# Patient Record
(demographics unavailable — no encounter records)

---

## 2025-04-22 NOTE — HISTORY OF PRESENT ILLNESS
[FreeTextEntry1] : pt here to establish care- nail injury [de-identified] : Mello Marquez is a 22 yo male with no PMHx  presenting to establish care, eval recent nail avulsion and c/f hyperhydrosis. Patient seen with mother at bedside.   #Left toe nail avulsion: on 4/19 someone stepped on his left big toe toenail. He had seen a Doctor in a hotel that used anesthetic and removed the toenail. He was recommended to follow up with a PCP regarding the injury and for further care. Since his injury he has kept the area wrapped, he has not visualized any pus in the area, he has not changed the dressing. He does not have significant pain at the site. He states that site is more swollen then prior. His mother would like a podiatry referral as well.    #hyperhydrosis, states that it has been ongoing for several months specifically in \his palms arms and other sweat glands, and feet, no limitations in day to day however would like to have it addressed. Uses antiperspirant. However, also notes that he has very cold hands and feet.    ROS: negative for fever, chills, chest pain, sob, nvdc, gu symptoms, ntw   PMHX: no pmhx  PSX: no surgical interventions  Past family hx: distant hx of diabetes in [place]  Medications: No medications  Allergies: tree Nut allergy (gets itchy throat no hx of anaphylaxis. (seasonal)   Recent hospitalizations: none          Social hx:   Smoking none, no nicotine or tobacco use.  Alcoholic beverages: Every other week, 3-4 cocktails standard size.   Drug use: IVDU none, no cocaine heroin or marijuana use.  Occupational: recent graduated looking for a job working. [employer] of [employer].   Nutrition: working to get a good balance of fruits and veggies.   Exercises: goes to the gym and plays basketball regularly.   Sexual: not active, not interested in STI, STD, HIV Testing, Hep b or c testing    HCM:   HTN: negative  Phq -2 negative: 0  Refills: none

## 2025-04-22 NOTE — HISTORY OF PRESENT ILLNESS
[FreeTextEntry1] : pt here to establish care- nail injury [de-identified] : Mello Marquez is a 20 yo male with no PMHx  presenting to establish care, eval recent nail avulsion and c/f hyperhydrosis. Patient seen with mother at bedside.   #Left toe nail avulsion: on 4/19 someone stepped on his left big toe toenail. He had seen a Doctor in a hotel that used anesthetic and removed the toenail. He was recommended to follow up with a PCP regarding the injury and for further care. Since his injury he has kept the area wrapped, he has not visualized any pus in the area, he has not changed the dressing. He does not have significant pain at the site. He states that site is more swollen then prior. His mother would like a podiatry referral as well.    #hyperhydrosis, states that it has been ongoing for several months specifically in \his palms arms and other sweat glands, and feet, no limitations in day to day however would like to have it addressed. Uses antiperspirant. However, also notes that he has very cold hands and feet.    ROS: negative for fever, chills, chest pain, sob, nvdc, gu symptoms, ntw   PMHX: no pmhx  PSX: no surgical interventions  Past family hx: distant hx of diabetes in [place]  Medications: No medications  Allergies: tree Nut allergy (gets itchy throat no hx of anaphylaxis. (seasonal)   Recent hospitalizations: none          Social hx:   Smoking none, no nicotine or tobacco use.  Alcoholic beverages: Every other week, 3-4 cocktails standard size.   Drug use: IVDU none, no cocaine heroin or marijuana use.  Occupational: recent graduated looking for a job working. [employer].   Nutrition: working to get a good balance of fruits and veggies.   Exercises: goes to the gym and plays basketball regularly.   Sexual: not active, not interested in STI, STD, HIV Testing, Hep b or c testing    HCM:   HTN: negative  Phq -2 negative: 0  Refills: none

## 2025-04-22 NOTE — END OF VISIT
[] : Resident [FreeTextEntry3] : #left toenail injury - see podiatry/derm  #hyperhidrosis - hands feet. get labs  #CPE= declined std testing [Time Spent: ___ minutes] : I have spent [unfilled] minutes of time on the encounter which excludes teaching and separately reported services.

## 2025-04-22 NOTE — PHYSICAL EXAM
[No Acute Distress] : no acute distress [Well Nourished] : well nourished [Well Developed] : well developed [Well-Appearing] : well-appearing [PERRL] : pupils equal round and reactive to light [EOMI] : extraocular movements intact [Normal Outer Ear/Nose] : the outer ears and nose were normal in appearance [Normal Oropharynx] : the oropharynx was normal [No JVD] : no jugular venous distention [No Lymphadenopathy] : no lymphadenopathy [Supple] : supple [Thyroid Normal, No Nodules] : the thyroid was normal and there were no nodules present [No Respiratory Distress] : no respiratory distress  [No Accessory Muscle Use] : no accessory muscle use [Clear to Auscultation] : lungs were clear to auscultation bilaterally [Normal Rate] : normal rate  [Regular Rhythm] : with a regular rhythm [Normal S1, S2] : normal S1 and S2 [No Murmur] : no murmur heard [No Carotid Bruits] : no carotid bruits [No Varicosities] : no varicosities [No Edema] : there was no peripheral edema [Soft] : abdomen soft [Non Tender] : non-tender [Non-distended] : non-distended [Normal Bowel Sounds] : normal bowel sounds [Normal Posterior Cervical Nodes] : no posterior cervical lymphadenopathy [Normal Anterior Cervical Nodes] : no anterior cervical lymphadenopathy [No Spinal Tenderness] : no spinal tenderness [Grossly Normal Strength/Tone] : grossly normal strength/tone [No Focal Deficits] : no focal deficits [Normal Affect] : the affect was normal [Normal Insight/Judgement] : insight and judgment were intact [de-identified] : Noted to have right nail blackened and hardened from prior injury  Noted to have white granulation at the nailbed, with erythema, with mild purulence, no erythema. Swelling on L mildly greater than right.  [de-identified] : gait pain limited 2/2 injury of left toe

## 2025-04-22 NOTE — PHYSICAL EXAM
[No Acute Distress] : no acute distress [Well Nourished] : well nourished [Well Developed] : well developed [Well-Appearing] : well-appearing [PERRL] : pupils equal round and reactive to light [EOMI] : extraocular movements intact [Normal Outer Ear/Nose] : the outer ears and nose were normal in appearance [Normal Oropharynx] : the oropharynx was normal [No JVD] : no jugular venous distention [No Lymphadenopathy] : no lymphadenopathy [Supple] : supple [Thyroid Normal, No Nodules] : the thyroid was normal and there were no nodules present [No Respiratory Distress] : no respiratory distress  [No Accessory Muscle Use] : no accessory muscle use [Clear to Auscultation] : lungs were clear to auscultation bilaterally [Normal Rate] : normal rate  [Regular Rhythm] : with a regular rhythm [Normal S1, S2] : normal S1 and S2 [No Murmur] : no murmur heard [No Carotid Bruits] : no carotid bruits [No Varicosities] : no varicosities [No Edema] : there was no peripheral edema [Soft] : abdomen soft [Non Tender] : non-tender [Non-distended] : non-distended [Normal Bowel Sounds] : normal bowel sounds [Normal Posterior Cervical Nodes] : no posterior cervical lymphadenopathy [Normal Anterior Cervical Nodes] : no anterior cervical lymphadenopathy [No Spinal Tenderness] : no spinal tenderness [Grossly Normal Strength/Tone] : grossly normal strength/tone [No Focal Deficits] : no focal deficits [Normal Affect] : the affect was normal [Normal Insight/Judgement] : insight and judgment were intact [de-identified] : Noted to have right nail blackened and hardened from prior injury  Noted to have white granulation at the nailbed, with erythema, with mild purulence, no erythema. Swelling on L mildly greater than right.  [de-identified] : gait pain limited 2/2 injury of left toe

## 2025-04-22 NOTE — HISTORY OF PRESENT ILLNESS
[FreeTextEntry1] : pt here to establish care- nail injury [de-identified] : Mello Marquez is a 20 yo male with no PMHx  presenting to establish care, eval recent nail avulsion and c/f hyperhydrosis. Patient seen with mother at bedside.   #Left toe nail avulsion: on 4/19 someone stepped on his left big toe toenail. He had seen a Doctor in a hotel that used anesthetic and removed the toenail. He was recommended to follow up with a PCP regarding the injury and for further care. Since his injury he has kept the area wrapped, he has not visualized any pus in the area, he has not changed the dressing. He does not have significant pain at the site. He states that site is more swollen then prior. His mother would like a podiatry referral as well.    #hyperhydrosis, states that it has been ongoing for several months specifically in \his palms arms and other sweat glands, and feet, no limitations in day to day however would like to have it addressed. Uses antiperspirant. However, also notes that he has very cold hands and feet.    ROS: negative for fever, chills, chest pain, sob, nvdc, gu symptoms, ntw   PMHX: no pmhx  PSX: no surgical interventions  Past family hx: distant hx of diabetes in [place]  Medications: No medications  Allergies: tree Nut allergy (gets itchy throat no hx of anaphylaxis. (seasonal)   Recent hospitalizations: none          Social hx:   Smoking none, no nicotine or tobacco use.  Alcoholic beverages: Every other week, 3-4 cocktails standard size.   Drug use: IVDU none, no cocaine heroin or marijuana use.  Occupational: recent graduated looking for a job working. [employer].   Nutrition: working to get a good balance of fruits and veggies.   Exercises: goes to the gym and plays basketball regularly.   Sexual: not active, not interested in STI, STD, HIV Testing, Hep b or c testing    HCM:   HTN: negative  Phq -2 negative: 0  Refills: none

## 2025-04-22 NOTE — PHYSICAL EXAM
[No Acute Distress] : no acute distress [Well Nourished] : well nourished [Well Developed] : well developed [Well-Appearing] : well-appearing [PERRL] : pupils equal round and reactive to light [EOMI] : extraocular movements intact [Normal Outer Ear/Nose] : the outer ears and nose were normal in appearance [Normal Oropharynx] : the oropharynx was normal [No JVD] : no jugular venous distention [No Lymphadenopathy] : no lymphadenopathy [Supple] : supple [Thyroid Normal, No Nodules] : the thyroid was normal and there were no nodules present [No Respiratory Distress] : no respiratory distress  [No Accessory Muscle Use] : no accessory muscle use [Clear to Auscultation] : lungs were clear to auscultation bilaterally [Normal Rate] : normal rate  [Regular Rhythm] : with a regular rhythm [Normal S1, S2] : normal S1 and S2 [No Murmur] : no murmur heard [No Carotid Bruits] : no carotid bruits [No Varicosities] : no varicosities [No Edema] : there was no peripheral edema [Soft] : abdomen soft [Non Tender] : non-tender [Non-distended] : non-distended [Normal Bowel Sounds] : normal bowel sounds [Normal Posterior Cervical Nodes] : no posterior cervical lymphadenopathy [Normal Anterior Cervical Nodes] : no anterior cervical lymphadenopathy [No Spinal Tenderness] : no spinal tenderness [Grossly Normal Strength/Tone] : grossly normal strength/tone [No Focal Deficits] : no focal deficits [Normal Affect] : the affect was normal [Normal Insight/Judgement] : insight and judgment were intact [de-identified] : Noted to have right nail blackened and hardened from prior injury  Noted to have white granulation at the nailbed, with erythema, with mild purulence, no erythema. Swelling on L mildly greater than right.  [de-identified] : gait pain limited 2/2 injury of left toe